# Patient Record
Sex: FEMALE | Race: WHITE | Employment: UNEMPLOYED | URBAN - METROPOLITAN AREA
[De-identification: names, ages, dates, MRNs, and addresses within clinical notes are randomized per-mention and may not be internally consistent; named-entity substitution may affect disease eponyms.]

---

## 2019-09-01 ENCOUNTER — HOSPITAL ENCOUNTER (EMERGENCY)
Age: 44
Discharge: HOME | End: 2019-09-01
Payer: COMMERCIAL

## 2019-09-01 VITALS
HEART RATE: 69 BPM | RESPIRATION RATE: 18 BRPM | DIASTOLIC BLOOD PRESSURE: 100 MMHG | TEMPERATURE: 98.8 F | HEIGHT: 63 IN | OXYGEN SATURATION: 95 % | SYSTOLIC BLOOD PRESSURE: 162 MMHG | WEIGHT: 220 LBS | BODY MASS INDEX: 38.98 KG/M2

## 2019-09-01 DIAGNOSIS — K02.9 DENTAL CARIES: ICD-10-CM

## 2019-09-01 DIAGNOSIS — K05.00 ACUTE GINGIVITIS: ICD-10-CM

## 2019-09-01 DIAGNOSIS — K08.89 DENTALGIA: Primary | ICD-10-CM

## 2019-09-01 PROCEDURE — 99282 EMERGENCY DEPT VISIT SF MDM: CPT

## 2019-09-01 RX ORDER — BUTALBITAL, ACETAMINOPHEN AND CAFFEINE 50; 325; 40 MG/1; MG/1; MG/1
1 TABLET ORAL EVERY 4 HOURS PRN
COMMUNITY

## 2019-09-01 RX ORDER — ACETAMINOPHEN AND CODEINE PHOSPHATE 300; 30 MG/1; MG/1
1 TABLET ORAL EVERY 4 HOURS PRN
Qty: 7 TABLET | Refills: 0 | Status: SHIPPED | OUTPATIENT
Start: 2019-09-01 | End: 2019-09-04

## 2019-09-01 RX ORDER — NAPROXEN 500 MG/1
500 TABLET ORAL 2 TIMES DAILY
Qty: 20 TABLET | Refills: 0 | Status: SHIPPED | OUTPATIENT
Start: 2019-09-01 | End: 2019-09-11

## 2019-09-01 RX ORDER — AZITHROMYCIN 250 MG/1
TABLET, FILM COATED ORAL
Qty: 1 PACKET | Refills: 0 | Status: SHIPPED | OUTPATIENT
Start: 2019-09-01 | End: 2019-09-11

## 2019-09-01 ASSESSMENT — ENCOUNTER SYMPTOMS
WHEEZING: 0
STRIDOR: 0
RHINORRHEA: 0
NAUSEA: 0
CONSTIPATION: 0
VOICE CHANGE: 0
FACIAL SWELLING: 0
COUGH: 0
SORE THROAT: 0
SINUS PAIN: 0
COLOR CHANGE: 0
VOMITING: 0
SHORTNESS OF BREATH: 0
DIARRHEA: 0
SINUS PRESSURE: 0
ABDOMINAL DISTENTION: 0
TROUBLE SWALLOWING: 0
ABDOMINAL PAIN: 0
BACK PAIN: 0

## 2019-09-01 ASSESSMENT — PAIN DESCRIPTION - LOCATION: LOCATION: TEETH

## 2019-09-01 ASSESSMENT — PAIN DESCRIPTION - PAIN TYPE: TYPE: ACUTE PAIN

## 2019-09-01 ASSESSMENT — PAIN SCALES - GENERAL: PAINLEVEL_OUTOF10: 10

## 2024-10-08 ENCOUNTER — OFFICE VISIT (OUTPATIENT)
Age: 49
End: 2024-10-08

## 2024-10-08 VITALS
WEIGHT: 227 LBS | DIASTOLIC BLOOD PRESSURE: 130 MMHG | OXYGEN SATURATION: 97 % | HEART RATE: 79 BPM | BODY MASS INDEX: 40.22 KG/M2 | TEMPERATURE: 98 F | HEIGHT: 63 IN | SYSTOLIC BLOOD PRESSURE: 230 MMHG

## 2024-10-08 DIAGNOSIS — I10 HYPERTENSION, UNSPECIFIED TYPE: ICD-10-CM

## 2024-10-08 DIAGNOSIS — K04.7 DENTAL ABSCESS: Primary | ICD-10-CM

## 2024-10-08 RX ORDER — SULFAMETHOXAZOLE/TRIMETHOPRIM 800-160 MG
1 TABLET ORAL 2 TIMES DAILY
Qty: 20 TABLET | Refills: 0 | Status: SHIPPED | OUTPATIENT
Start: 2024-10-08 | End: 2024-10-18

## 2024-10-08 RX ORDER — SULFAMETHOXAZOLE/TRIMETHOPRIM 800-160 MG
1 TABLET ORAL 2 TIMES DAILY
Qty: 20 TABLET | Refills: 0 | Status: SHIPPED | OUTPATIENT
Start: 2024-10-08 | End: 2024-10-08

## 2024-10-08 RX ORDER — PROMETHAZINE HYDROCHLORIDE 25 MG/1
25 TABLET ORAL 4 TIMES DAILY PRN
Qty: 20 TABLET | Refills: 0 | Status: SHIPPED | OUTPATIENT
Start: 2024-10-08 | End: 2024-10-13

## 2024-10-08 NOTE — PROGRESS NOTES
Helga Hinojosa (:  1975) is a 49 y.o. female,New patient, here for evaluation of the following chief complaint(s):  Dental Pain (Abscess x3 days )      ASSESSMENT/PLAN:  1. Dental abscess  - sulfamethoxazole-trimethoprim (BACTRIM DS;SEPTRA DS) 800-160 MG per tablet; Take 1 tablet by mouth 2 times daily for 10 days  Dispense: 20 tablet; Refill: 0  - promethazine (PHENERGAN) 25 MG tablet; Take 1 tablet by mouth 4 times daily as needed for Nausea  Dispense: 20 tablet; Refill: 0    2. Hypertension, unspecified type  - Amb Referral to Primary Care   -TIME SPENT WITH PATIENT 38 MINUTES.    Return if symptoms worsen or fail to improve.    SUBJECTIVE/OBJECTIVE:  PRESENT TO CLINIC WITH DENTAL ABSCESS FOR THREE DAYS. NO FEVER.       History provided by:  Patient  Dental Pain         Vitals:    10/08/24 1328 10/08/24 1345   BP: (!) 256/143 (!) 230/130   Site: Right Upper Arm Left Upper Arm   Position: Sitting Sitting   Cuff Size: Large Adult Large Adult   Pulse: 79    Temp: 98 °F (36.7 °C)    TempSrc: Oral    SpO2: 97%    Weight: 103 kg (227 lb)    Height: 1.6 m (5' 3\")        Review of Systems   HENT:  Positive for dental problem.        Physical Exam  Constitutional:       Appearance: Normal appearance.   HENT:      Head: Normocephalic and atraumatic.      Nose: Nose normal.      Mouth/Throat:      Mouth: Mucous membranes are moist.   Eyes:      Pupils: Pupils are equal, round, and reactive to light.   Pulmonary:      Effort: Pulmonary effort is normal.      Breath sounds: Normal breath sounds.   Musculoskeletal:         General: Normal range of motion.      Cervical back: Normal range of motion and neck supple.   Skin:     General: Skin is warm.   Neurological:      Mental Status: She is alert and oriented to person, place, and time.   Psychiatric:         Mood and Affect: Mood normal.         Behavior: Behavior normal.           An electronic signature was used to authenticate this note.    --Silvina oMra DO